# Patient Record
Sex: MALE
[De-identification: names, ages, dates, MRNs, and addresses within clinical notes are randomized per-mention and may not be internally consistent; named-entity substitution may affect disease eponyms.]

---

## 2018-09-11 ENCOUNTER — HOSPITAL (OUTPATIENT)
Dept: OTHER | Age: 53
End: 2018-09-11
Attending: EMERGENCY MEDICINE

## 2018-09-11 LAB
ALBUMIN SERPL-MCNC: 3.7 GM/DL (ref 3.6–5.1)
ALBUMIN/GLOB SERPL: 1 {RATIO} (ref 1–2.4)
ALP SERPL-CCNC: 102 UNIT/L (ref 45–117)
ALT SERPL-CCNC: 79 UNIT/L
AMPHETAMINES UR QL SCN>500 NG/ML: NEGATIVE
ANALYZER ANC (IANC): NORMAL
ANION GAP SERPL CALC-SCNC: 21 MMOL/L (ref 10–20)
APTT PPP: 23 SECONDS (ref 22–30)
APTT PPP: NORMAL S
AST SERPL-CCNC: 40 UNIT/L
BARBITURATES UR QL SCN>200 NG/ML: NEGATIVE
BASOPHILS # BLD: 0 THOUSAND/MCL (ref 0–0.3)
BASOPHILS NFR BLD: 1 %
BENZODIAZ UR QL SCN>200 NG/ML: NEGATIVE
BILIRUB SERPL-MCNC: 0.4 MG/DL (ref 0.2–1)
BUN SERPL-MCNC: 21 MG/DL (ref 6–20)
BUN/CREAT SERPL: 20 (ref 7–25)
BZE UR QL SCN>150 NG/ML: POSITIVE
CALCIUM SERPL-MCNC: 8.7 MG/DL (ref 8.4–10.2)
CANNABINOIDS UR QL SCN>50 NG/ML: NEGATIVE
CHLORIDE: 101 MMOL/L (ref 98–107)
CO2 SERPL-SCNC: 19 MMOL/L (ref 21–32)
CREAT SERPL-MCNC: 1.05 MG/DL (ref 0.67–1.17)
DIFFERENTIAL METHOD BLD: NORMAL
EOSINOPHIL # BLD: 0.3 THOUSAND/MCL (ref 0.1–0.5)
EOSINOPHIL NFR BLD: 4 %
ERYTHROCYTE [DISTWIDTH] IN BLOOD: 13.4 % (ref 11–15)
ETHANOL SERPL-MCNC: NORMAL MG/DL
GLOBULIN SER-MCNC: 3.7 GM/DL (ref 2–4)
GLUCOSE SERPL-MCNC: 127 MG/DL (ref 65–99)
HEMATOCRIT: 43.1 % (ref 39–51)
HGB BLD-MCNC: 14.8 GM/DL (ref 13–17)
INR PPP: 1
LYMPHOCYTES # BLD: 2.5 THOUSAND/MCL (ref 1–4)
LYMPHOCYTES NFR BLD: 29 %
MAGNESIUM SERPL-MCNC: 2.2 MG/DL (ref 1.7–2.4)
MCH RBC QN AUTO: 32 PG (ref 26–34)
MCHC RBC AUTO-ENTMCNC: 34.3 GM/DL (ref 32–36.5)
MCV RBC AUTO: 93.1 FL (ref 78–100)
MONOCYTES # BLD: 0.8 THOUSAND/MCL (ref 0.3–0.9)
MONOCYTES NFR BLD: 9 %
NEUTROPHILS # BLD: 5.1 THOUSAND/MCL (ref 1.8–7.7)
NEUTROPHILS NFR BLD: 57 %
NEUTS SEG NFR BLD: NORMAL %
NRBC (NRBCRE): NORMAL
NT-PROBNP SERPL-MCNC: 65 PG/ML
OPIATES UR QL SCN>300 NG/ML: NEGATIVE
PCP UR QL SCN>25 NG/ML: NEGATIVE
PLATELET # BLD: 371 THOUSAND/MCL (ref 140–450)
POTASSIUM SERPL-SCNC: 3.8 MMOL/L (ref 3.4–5.1)
PROT SERPL-MCNC: 7.4 GM/DL (ref 6.4–8.2)
PROTHROMBIN TIME: 9.8 SECONDS (ref 9.7–11.8)
PROTHROMBIN TIME: NORMAL
RBC # BLD: 4.63 MILLION/MCL (ref 4.5–5.9)
SODIUM SERPL-SCNC: 137 MMOL/L (ref 135–145)
TROPONIN I SERPL HS-MCNC: <0.02 NG/ML
WBC # BLD: 8.6 THOUSAND/MCL (ref 4.2–11)

## 2018-09-12 ENCOUNTER — DIAGNOSTIC TRANS (OUTPATIENT)
Dept: OTHER | Age: 53
End: 2018-09-12

## 2018-09-12 ENCOUNTER — HOSPITAL (OUTPATIENT)
Dept: OTHER | Age: 53
End: 2018-09-12
Attending: EMERGENCY MEDICINE

## 2018-09-12 LAB
AMPHETAMINES UR QL SCN>500 NG/ML: NEGATIVE
ANALYZER ANC (IANC): NORMAL
ANION GAP SERPL CALC-SCNC: 11 MMOL/L (ref 10–20)
BARBITURATES UR QL SCN>200 NG/ML: NEGATIVE
BASOPHILS # BLD: 0 THOUSAND/MCL (ref 0–0.3)
BASOPHILS NFR BLD: 0 %
BENZODIAZ UR QL SCN>200 NG/ML: NEGATIVE
BUN SERPL-MCNC: 13 MG/DL (ref 6–20)
BUN/CREAT SERPL: 16 (ref 7–25)
BZE UR QL SCN>150 NG/ML: POSITIVE
CALCIUM SERPL-MCNC: 8.6 MG/DL (ref 8.4–10.2)
CANNABINOIDS UR QL SCN>50 NG/ML: NEGATIVE
CHLORIDE: 105 MMOL/L (ref 98–107)
CO2 SERPL-SCNC: 27 MMOL/L (ref 21–32)
CREAT SERPL-MCNC: 0.8 MG/DL (ref 0.67–1.17)
DIFFERENTIAL METHOD BLD: NORMAL
EOSINOPHIL # BLD: 0.1 THOUSAND/MCL (ref 0.1–0.5)
EOSINOPHIL NFR BLD: 2 %
ERYTHROCYTE [DISTWIDTH] IN BLOOD: 13.5 % (ref 11–15)
ETHANOL SERPL-MCNC: NORMAL MG/DL
GLUCOSE SERPL-MCNC: 101 MG/DL (ref 65–99)
HEMATOCRIT: 43.1 % (ref 39–51)
HGB BLD-MCNC: 15 GM/DL (ref 13–17)
LYMPHOCYTES # BLD: 1 THOUSAND/MCL (ref 1–4)
LYMPHOCYTES NFR BLD: 13 %
MCH RBC QN AUTO: 32.2 PG (ref 26–34)
MCHC RBC AUTO-ENTMCNC: 34.8 GM/DL (ref 32–36.5)
MCV RBC AUTO: 92.5 FL (ref 78–100)
MONOCYTES # BLD: 0.7 THOUSAND/MCL (ref 0.3–0.9)
MONOCYTES NFR BLD: 10 %
NEUTROPHILS # BLD: 5.3 THOUSAND/MCL (ref 1.8–7.7)
NEUTROPHILS NFR BLD: 75 %
NEUTS SEG NFR BLD: NORMAL %
NRBC (NRBCRE): NORMAL
OPIATES UR QL SCN>300 NG/ML: NEGATIVE
PCP UR QL SCN>25 NG/ML: NEGATIVE
PLATELET # BLD: 318 THOUSAND/MCL (ref 140–450)
POTASSIUM SERPL-SCNC: 4 MMOL/L (ref 3.4–5.1)
RBC # BLD: 4.66 MILLION/MCL (ref 4.5–5.9)
SODIUM SERPL-SCNC: 139 MMOL/L (ref 135–145)
TROPONIN I SERPL HS-MCNC: <0.02 NG/ML
TROPONIN I SERPL HS-MCNC: <0.02 NG/ML
WBC # BLD: 7.1 THOUSAND/MCL (ref 4.2–11)

## 2018-09-14 ENCOUNTER — DIAGNOSTIC TRANS (OUTPATIENT)
Dept: OTHER | Age: 53
End: 2018-09-14

## 2019-05-28 ENCOUNTER — HOSPITAL (OUTPATIENT)
Dept: OTHER | Age: 54
End: 2019-05-28

## 2020-12-26 ENCOUNTER — HOSPITAL ENCOUNTER (OUTPATIENT)
Age: 55
Discharge: HOME | End: 2020-12-26
Payer: MEDICAID

## 2020-12-26 VITALS
DIASTOLIC BLOOD PRESSURE: 89 MMHG | RESPIRATION RATE: 18 BRPM | SYSTOLIC BLOOD PRESSURE: 162 MMHG | TEMPERATURE: 98 F | HEART RATE: 93 BPM | OXYGEN SATURATION: 100 %

## 2020-12-26 DIAGNOSIS — I10 HYPERTENSION, UNSPECIFIED TYPE: ICD-10-CM

## 2020-12-26 DIAGNOSIS — Z76.0 ENCOUNTER FOR MEDICATION REFILL: Primary | ICD-10-CM

## 2020-12-26 DIAGNOSIS — Z71.6 ENCOUNTER FOR SMOKING CESSATION COUNSELING: ICD-10-CM

## 2020-12-26 DIAGNOSIS — A63.0 WARTS, GENITAL: ICD-10-CM

## 2020-12-26 PROCEDURE — 99203 OFFICE O/P NEW LOW 30 MIN: CPT | Performed by: NURSE PRACTITIONER

## 2020-12-26 RX ORDER — LISINOPRIL 40 MG/1
40 TABLET ORAL DAILY
Qty: 30 TABLET | Refills: 0 | Status: SHIPPED | OUTPATIENT
Start: 2020-12-26 | End: 2021-01-25

## 2020-12-26 RX ORDER — LISINOPRIL 10 MG/1
40 TABLET ORAL DAILY
COMMUNITY

## 2020-12-26 RX ORDER — QUETIAPINE 100 MG/1
25 TABLET, FILM COATED ORAL NIGHTLY
COMMUNITY
Start: 2020-12-17

## 2020-12-26 RX ORDER — GABAPENTIN 300 MG/1
900 CAPSULE ORAL 2 TIMES DAILY
Status: ON HOLD | COMMUNITY
Start: 2020-12-17 | End: 2021-05-15

## 2020-12-26 RX ORDER — TRAZODONE HYDROCHLORIDE 150 MG/1
150 TABLET ORAL NIGHTLY PRN
Status: ON HOLD | COMMUNITY
Start: 2020-12-17 | End: 2021-05-15

## 2020-12-26 NOTE — ED PROVIDER NOTES
Patient Seen in: [facility]      History   Patient presents with:  Headache    Stated Complaint: HEADACHES, DIZZINESS X 7 DAYS    HPI    63yr old male, just discharged from [address] [facility] group home here today for a medication refill.   Pt reports wh oriented to person, place and time. Not in acute distress. HEENT: Head is normocephalic atraumatic. No oral vesicles or oral pallor. Mucous membranes moist.      Neck: No cervical lymphadenopathy. No stridor. Supple. No meningsmus.       Heart: S1-S2. daily.  Qty: 30 tablet Refills: 0    !! - Potential duplicate medications found. Please discuss with provider.

## 2020-12-26 NOTE — ED INITIAL ASSESSMENT (HPI)
Pt c/o headaches and dizziness x 3-4 days, states he ran out of blood pressure medication 1 week ago. He also states he has a wart on penile are.

## 2021-01-04 ENCOUNTER — HOSPITAL ENCOUNTER (OUTPATIENT)
Age: 56
Discharge: HOME | End: 2021-01-04
Payer: MEDICAID

## 2021-03-14 ENCOUNTER — HOSPITAL ENCOUNTER (EMERGENCY)
Facility: HOSPITAL | Age: 56
Discharge: HOME | End: 2021-03-14
Attending: EMERGENCY MEDICINE
Payer: MEDICAID

## 2021-03-14 ENCOUNTER — APPOINTMENT (OUTPATIENT)
Dept: CT IMAGING | Facility: HOSPITAL | Age: 56
End: 2021-03-14
Attending: EMERGENCY MEDICINE
Payer: MEDICAID

## 2021-03-14 VITALS
BODY MASS INDEX: 26.48 KG/M2 | HEART RATE: 80 BPM | HEIGHT: 70 IN | WEIGHT: 185 LBS | RESPIRATION RATE: 20 BRPM | DIASTOLIC BLOOD PRESSURE: 80 MMHG | TEMPERATURE: 98 F | OXYGEN SATURATION: 98 % | SYSTOLIC BLOOD PRESSURE: 123 MMHG

## 2021-03-14 DIAGNOSIS — R91.1 PULMONARY NODULE: ICD-10-CM

## 2021-03-14 DIAGNOSIS — S29.019A THORACIC MYOFASCIAL STRAIN, INITIAL ENCOUNTER: Primary | ICD-10-CM

## 2021-03-14 DIAGNOSIS — M62.838 SPASM OF MUSCLE: ICD-10-CM

## 2021-03-14 LAB
ANION GAP SERPL CALC-SCNC: 3 MMOL/L (ref 0–18)
BASOPHILS # BLD AUTO: 0.05 X10(3) UL (ref 0–0.2)
BASOPHILS NFR BLD AUTO: 0.5 %
BILIRUB UR QL: NEGATIVE
BUN BLD-MCNC: 12 MG/DL (ref 7–18)
BUN/CREAT SERPL: 14.5 (ref 10–20)
CALCIUM BLD-MCNC: 8.8 MG/DL (ref 8.5–10.1)
CHLORIDE SERPL-SCNC: 105 MMOL/L (ref 98–112)
CLARITY UR: CLEAR
CO2 SERPL-SCNC: 31 MMOL/L (ref 21–32)
COLOR UR: YELLOW
CREAT BLD-MCNC: 0.83 MG/DL
DEPRECATED RDW RBC AUTO: 41.3 FL (ref 35.1–46.3)
EOSINOPHIL # BLD AUTO: 0.19 X10(3) UL (ref 0–0.7)
EOSINOPHIL NFR BLD AUTO: 2 %
ERYTHROCYTE [DISTWIDTH] IN BLOOD BY AUTOMATED COUNT: 12 % (ref 11–15)
GLUCOSE BLD-MCNC: 82 MG/DL (ref 70–99)
GLUCOSE UR-MCNC: 50 MG/DL
HCT VFR BLD AUTO: 36.6 %
HGB BLD-MCNC: 12.5 G/DL
IMM GRANULOCYTES # BLD AUTO: 0.05 X10(3) UL (ref 0–1)
IMM GRANULOCYTES NFR BLD: 0.5 %
KETONES UR-MCNC: NEGATIVE MG/DL
LEUKOCYTE ESTERASE UR QL STRIP.AUTO: NEGATIVE
LYMPHOCYTES # BLD AUTO: 1.21 X10(3) UL (ref 1–4)
LYMPHOCYTES NFR BLD AUTO: 12.6 %
MCH RBC QN AUTO: 32 PG (ref 26–34)
MCHC RBC AUTO-ENTMCNC: 34.2 G/DL (ref 31–37)
MCV RBC AUTO: 93.6 FL
MONOCYTES # BLD AUTO: 0.55 X10(3) UL (ref 0.1–1)
MONOCYTES NFR BLD AUTO: 5.7 %
NEUTROPHILS # BLD AUTO: 7.53 X10 (3) UL (ref 1.5–7.7)
NEUTROPHILS # BLD AUTO: 7.53 X10(3) UL (ref 1.5–7.7)
NEUTROPHILS NFR BLD AUTO: 78.7 %
NITRITE UR QL STRIP.AUTO: NEGATIVE
OSMOLALITY SERPL CALC.SUM OF ELEC: 287 MOSM/KG (ref 275–295)
PH UR: 7 [PH] (ref 5–8)
PLATELET # BLD AUTO: 293 10(3)UL (ref 150–450)
POTASSIUM SERPL-SCNC: 4.6 MMOL/L (ref 3.5–5.1)
PROT UR-MCNC: NEGATIVE MG/DL
RBC # BLD AUTO: 3.91 X10(6)UL
RBC #/AREA URNS AUTO: >10 /HPF
SODIUM SERPL-SCNC: 139 MMOL/L (ref 136–145)
SP GR UR STRIP: 1.01 (ref 1–1.03)
UROBILINOGEN UR STRIP-ACNC: 2
WBC # BLD AUTO: 9.6 X10(3) UL (ref 4–11)

## 2021-03-14 PROCEDURE — 99285 EMERGENCY DEPT VISIT HI MDM: CPT

## 2021-03-14 PROCEDURE — 81001 URINALYSIS AUTO W/SCOPE: CPT | Performed by: EMERGENCY MEDICINE

## 2021-03-14 PROCEDURE — 93005 ELECTROCARDIOGRAM TRACING: CPT

## 2021-03-14 PROCEDURE — 96375 TX/PRO/DX INJ NEW DRUG ADDON: CPT

## 2021-03-14 PROCEDURE — 96374 THER/PROPH/DIAG INJ IV PUSH: CPT

## 2021-03-14 PROCEDURE — 80048 BASIC METABOLIC PNL TOTAL CA: CPT

## 2021-03-14 PROCEDURE — 81001 URINALYSIS AUTO W/SCOPE: CPT

## 2021-03-14 PROCEDURE — 80048 BASIC METABOLIC PNL TOTAL CA: CPT | Performed by: EMERGENCY MEDICINE

## 2021-03-14 PROCEDURE — 74177 CT ABD & PELVIS W/CONTRAST: CPT | Performed by: EMERGENCY MEDICINE

## 2021-03-14 PROCEDURE — 85025 COMPLETE CBC W/AUTO DIFF WBC: CPT

## 2021-03-14 PROCEDURE — 85025 COMPLETE CBC W/AUTO DIFF WBC: CPT | Performed by: EMERGENCY MEDICINE

## 2021-03-14 PROCEDURE — 93010 ELECTROCARDIOGRAM REPORT: CPT | Performed by: EMERGENCY MEDICINE

## 2021-03-14 RX ORDER — LIDOCAINE 50 MG/G
1 PATCH TOPICAL EVERY 24 HOURS
Qty: 6 PATCH | Refills: 0 | Status: SHIPPED | OUTPATIENT
Start: 2021-03-14 | End: 2021-03-20

## 2021-03-14 RX ORDER — HYDROCODONE BITARTRATE AND ACETAMINOPHEN 5; 325 MG/1; MG/1
1 TABLET ORAL EVERY 6 HOURS PRN
Qty: 10 TABLET | Refills: 0 | Status: ON HOLD | OUTPATIENT
Start: 2021-03-14 | End: 2021-05-16

## 2021-03-14 RX ORDER — DIAZEPAM 5 MG/ML
5 INJECTION, SOLUTION INTRAMUSCULAR; INTRAVENOUS ONCE
Status: COMPLETED | OUTPATIENT
Start: 2021-03-14 | End: 2021-03-14

## 2021-03-14 RX ORDER — MORPHINE SULFATE 4 MG/ML
4 INJECTION, SOLUTION INTRAMUSCULAR; INTRAVENOUS ONCE
Status: COMPLETED | OUTPATIENT
Start: 2021-03-14 | End: 2021-03-14

## 2021-03-14 RX ORDER — CYCLOBENZAPRINE HCL 10 MG
10 TABLET ORAL 3 TIMES DAILY PRN
Qty: 10 TABLET | Refills: 0 | Status: SHIPPED | OUTPATIENT
Start: 2021-03-14 | End: 2021-03-17

## 2021-03-14 RX ORDER — KETOROLAC TROMETHAMINE 15 MG/ML
15 INJECTION, SOLUTION INTRAMUSCULAR; INTRAVENOUS ONCE
Status: COMPLETED | OUTPATIENT
Start: 2021-03-14 | End: 2021-03-14

## 2021-03-14 NOTE — ED PROVIDER NOTES
Patient Seen in: [facility] AND [facility] Emergency Department      History   Patient presents with:  Pain    Stated Complaint: left side pain    HPI/Subjective:   HPI    62 y/o male w/ outpt reported noneventful TURP at [facility] on Thursday feeling good Friday but well-developed. No distress. Head: Normocephalic and atraumatic. Eyes: Conjunctivae are normal. Pupils are equal, round, and reactive to light. Cardiovascular: Normal rate, regular rhythm and intact distal pulses.     Pulmonary/Chest: Effort normal. N Abnormal            Final result                 Please view results for these tests on the individual orders.    RAINBOW DRAW BLUE   RAINBOW DRAW LAVENDER   RAINBOW DRAW LIGHT GREEN   RAINBOW DRAW GOLD     EKG    Rate, intervals and axes as noted on EKG Re throughout the colon. Small hiatal hernia with wall thickening at the gastroesophageal junction. MESENTERY: Tiny umbilical hernia containing mesenteric fat. No bowel herniation.  PELVIS: Asymmetric wall thickening is seen involving the left lateral bladder to return here with any worsening or change. He and wife are comfortable with this discharge plan at this time.                          Disposition and Plan     Clinical Impression:  Thoracic myofascial strain, initial encounter  (primary encounter diagno

## 2021-03-14 NOTE — ED INITIAL ASSESSMENT (HPI)
Atraumatic left sided mid back since yesterday with pain with deep breathing. Denies abdominal pain    Had TURP procedure done on Friday at an outside facility.

## 2021-03-14 NOTE — ED NOTES
Patient safe to DC home per MD. Stafford Peer to dress self. DC teaching done, instructions reviewed with patient including when and how to follow up with healthcare providers and when to seek emergency care. The patient verbalizes understanding.  Peripheral IV disc

## 2021-05-15 ENCOUNTER — APPOINTMENT (OUTPATIENT)
Dept: GENERAL RADIOLOGY | Facility: HOSPITAL | Age: 56
DRG: 603 | End: 2021-05-15
Attending: EMERGENCY MEDICINE
Payer: MEDICAID

## 2021-05-15 ENCOUNTER — HOSPITAL ENCOUNTER (OUTPATIENT)
Facility: HOSPITAL | Age: 56
Setting detail: OBSERVATION
Discharge: HOME | DRG: 603 | End: 2021-05-16
Attending: EMERGENCY MEDICINE | Admitting: HOSPITALIST
Payer: MEDICAID

## 2021-05-15 DIAGNOSIS — L03.119 CELLULITIS OF HAND: ICD-10-CM
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
## 2021-05-15 DIAGNOSIS — S61.451A CAT BITE OF HAND, RIGHT, INITIAL ENCOUNTER: Primary | ICD-10-CM

## 2021-05-15 PROCEDURE — 73130 X-RAY EXAM OF HAND: CPT | Performed by: EMERGENCY MEDICINE

## 2021-05-15 PROCEDURE — 99285 EMERGENCY DEPT VISIT HI MDM: CPT

## 2021-05-15 PROCEDURE — 96365 THER/PROPH/DIAG IV INF INIT: CPT

## 2021-05-15 PROCEDURE — 96376 TX/PRO/DX INJ SAME DRUG ADON: CPT

## 2021-05-15 PROCEDURE — 85025 COMPLETE CBC W/AUTO DIFF WBC: CPT | Performed by: EMERGENCY MEDICINE

## 2021-05-15 PROCEDURE — 80048 BASIC METABOLIC PNL TOTAL CA: CPT | Performed by: EMERGENCY MEDICINE

## 2021-05-15 PROCEDURE — 96375 TX/PRO/DX INJ NEW DRUG ADDON: CPT

## 2021-05-15 RX ORDER — QUETIAPINE 25 MG/1
25 TABLET, FILM COATED ORAL NIGHTLY
Status: DISCONTINUED | OUTPATIENT
Start: 2021-05-15 | End: 2021-05-16

## 2021-05-15 RX ORDER — MORPHINE SULFATE 2 MG/ML
2 INJECTION, SOLUTION INTRAMUSCULAR; INTRAVENOUS EVERY 4 HOURS PRN
Status: DISCONTINUED | OUTPATIENT
Start: 2021-05-15 | End: 2021-05-16

## 2021-05-15 RX ORDER — HEPARIN SODIUM 5000 [USP'U]/ML
5000 INJECTION, SOLUTION INTRAVENOUS; SUBCUTANEOUS EVERY 12 HOURS SCHEDULED
Status: DISCONTINUED | OUTPATIENT
Start: 2021-05-15 | End: 2021-05-16

## 2021-05-15 RX ORDER — METOPROLOL SUCCINATE 25 MG/1
25 TABLET, EXTENDED RELEASE ORAL DAILY
Status: DISCONTINUED | OUTPATIENT
Start: 2021-05-15 | End: 2021-05-16

## 2021-05-15 RX ORDER — KETOROLAC TROMETHAMINE 15 MG/ML
15 INJECTION, SOLUTION INTRAMUSCULAR; INTRAVENOUS EVERY 6 HOURS PRN
Status: DISCONTINUED | OUTPATIENT
Start: 2021-05-15 | End: 2021-05-16

## 2021-05-15 RX ORDER — MORPHINE SULFATE 4 MG/ML
6 INJECTION, SOLUTION INTRAMUSCULAR; INTRAVENOUS ONCE
Status: COMPLETED | OUTPATIENT
Start: 2021-05-15 | End: 2021-05-15

## 2021-05-15 RX ORDER — SODIUM CHLORIDE 9 MG/ML
INJECTION, SOLUTION INTRAVENOUS CONTINUOUS
Status: DISCONTINUED | OUTPATIENT
Start: 2021-05-15 | End: 2021-05-16

## 2021-05-15 RX ORDER — LISINOPRIL 40 MG/1
40 TABLET ORAL DAILY
Status: DISCONTINUED | OUTPATIENT
Start: 2021-05-15 | End: 2021-05-16

## 2021-05-15 RX ORDER — MORPHINE SULFATE 4 MG/ML
4 INJECTION, SOLUTION INTRAMUSCULAR; INTRAVENOUS ONCE
Status: COMPLETED | OUTPATIENT
Start: 2021-05-15 | End: 2021-05-15

## 2021-05-15 RX ORDER — METOPROLOL SUCCINATE 25 MG/1
25 TABLET, EXTENDED RELEASE ORAL DAILY
COMMUNITY

## 2021-05-15 RX ORDER — HYDROCODONE BITARTRATE AND ACETAMINOPHEN 5; 325 MG/1; MG/1
1 TABLET ORAL EVERY 6 HOURS PRN
Status: DISCONTINUED | OUTPATIENT
Start: 2021-05-15 | End: 2021-05-16

## 2021-05-15 RX ORDER — KETOROLAC TROMETHAMINE 15 MG/ML
15 INJECTION, SOLUTION INTRAMUSCULAR; INTRAVENOUS ONCE
Status: COMPLETED | OUTPATIENT
Start: 2021-05-15 | End: 2021-05-15

## 2021-05-15 NOTE — H&P
[facility]    History & Physical    Liya Albasilio Patient Status:  Inpatient    1965 MRN [id number]   Location [facility] 4W/SW/SE Attending Yeimi Blackmon MD   Hosp Day # 0 PCP PHYSICIAN NONSTAFF     Date:  5/15/2021  Date of Exam:   Vital Signs:  Blood pressure 133/89, pulse 75, temperature 98.5 °F (36.9 °C), temperature source Oral, resp. rate 17, height 5' 9\" (1.753 m), weight 174 lb (78.9 kg), SpO2 100 %.      General NAD  HEENT PERRLA  CVS Normal S1 S2, no murmurs rubs or

## 2021-05-15 NOTE — ED QUICK NOTES
Orders for admission, patient is aware of plan and ready to go upstairs. Any questions, please call ED RN Chandrika King  at extension [phone].    Type of COVID test sent:Rapid  COVID Suspicion level: Low    Titratable drug(s) infusing:   Rate:    LOC at time of transp

## 2021-05-15 NOTE — CM/SW NOTE
Spoke with patient regarding out of [employer]    - unsure of costs of admission  -UR will send clinicals to insurance   -OON letter    Patient agreeing to stay

## 2021-05-15 NOTE — ED INITIAL ASSESSMENT (HPI)
Pt presents to Ed with c/o cat bite  To right hand , there is obvious skin break and swelling  Onset yesterday at 4pm

## 2021-05-15 NOTE — PLAN OF CARE
Received patient from ED. Alert and orientated. BP elevated arriving to the floor. Skin remains intact. Right hand swollen and red, x2 puncture sites from cat bite. Tolerating general diet. voiding freely. Calls appropriately. frequent rounding.  All needs m and evaluate response  - Consider cultural and social influences on pain and pain management  - Manage/alleviate anxiety  - Utilize distraction and/or relaxation techniques  - Monitor for opioid side effects  - Notify MD/LIP if interventions unsuccessful o interpreters to assist at discharge as needed  - Consider post-discharge preferences of patient/family/discharge partner  - Complete POLST form as appropriate  - Assess patient's ability to be responsible for managing their own health  - Refer to [facility]

## 2021-05-15 NOTE — ED PROVIDER NOTES
Patient Seen in: [facility] AND [facility] Emergency Department      History   Patient presents with:  Bite    Stated Complaint: Cat bites- on chemo    HPI/Subjective:   HPI    62 y/o male bit on the R and L hand yesterday by his small cat at home icing the hand distress. Musculoskeletal: Scattered abrasions with obvious nasal laceration redness or warmth or significant swelling to the left hand.   There is moderate swelling of the dorsal right hand as well as scattered areas of cellulitis redness and several sma and by the 5th MCP joint with redness to dorsal aspect of hand and general swelling. TECHNIQUE: 3 views were obtained. FINDINGS:  BONES: There is no fracture or dislocation.   There is no cortical osteolysis or periosteal reaction to suggest osteomyeliti close monitoring and repeat IV Unasyn dosing. I spoke with Dr. Carrillo Mi from hand surgeon she will be happy to see the patient. I spoke to Dr. Kaiser Torres on for unassigned internal medicine patient's today. The patient is stable and agreeable.   Admission dis

## 2021-05-16 VITALS
HEIGHT: 69 IN | SYSTOLIC BLOOD PRESSURE: 126 MMHG | DIASTOLIC BLOOD PRESSURE: 79 MMHG | OXYGEN SATURATION: 100 % | WEIGHT: 174 LBS | BODY MASS INDEX: 25.77 KG/M2 | TEMPERATURE: 98 F | HEART RATE: 66 BPM | RESPIRATION RATE: 18 BRPM

## 2021-05-16 PROCEDURE — 85025 COMPLETE CBC W/AUTO DIFF WBC: CPT | Performed by: HOSPITALIST

## 2021-05-16 PROCEDURE — 80053 COMPREHEN METABOLIC PANEL: CPT | Performed by: HOSPITALIST

## 2021-05-16 RX ORDER — HYDROCODONE BITARTRATE AND ACETAMINOPHEN 5; 325 MG/1; MG/1
1 TABLET ORAL EVERY 8 HOURS PRN
Qty: 10 TABLET | Refills: 0 | Status: SHIPPED | OUTPATIENT
Start: 2021-05-16

## 2021-05-16 RX ORDER — AMOXICILLIN AND CLAVULANATE POTASSIUM 875; 125 MG/1; MG/1
1 TABLET, FILM COATED ORAL 2 TIMES DAILY
Qty: 28 TABLET | Refills: 0 | Status: SHIPPED | OUTPATIENT
Start: 2021-05-16 | End: 2021-05-30

## 2021-05-16 NOTE — CONSULTS
[facility] ID CONSULT NOTE    Ciarra Bobo Patient Status:  Inpatient    1965 MRN [id number]   Location [facility] 4W/SW/SE Attending Nasra Simpson MD   Hosp Day # 1 PCP PHYSICIAN NONSTAFF       Reason for Cons injection 5,000 Units, 5,000 Units, Subcutaneous, 2 times per day  •  0.9% NaCl infusion, , Intravenous, Continuous  •  QUEtiapine Fumarate (SEROQUEL) tab 25 mg, 25 mg, Oral, Nightly    Review of Systems:  CONSTITUTIONAL:  No weight loss, weakness or fatig 8.3*   ALB  --  2.6*    143   K 3.9 4.3    111   CO2 29.0 30.0   ALKPHO  --  65   AST  --  12*   ALT  --  14*   BILT  --  0.5   TP  --  5.9*       Microbiology: Reviewed in EMR    Radiology: Reviewed    ASSESSMENT:    Antibiotics: Unasyn (5/15-

## 2021-05-16 NOTE — DISCHARGE SUMMARY
[facility] AND [facility]  Discharge Summary    Mac Cline Patient Status:  Inpatient    1965 MRN [id number]   Location [facility] 4W/SW/SE Attending Pily Lira MD   Hosp Day # 1 PCP PHYSICIAN NONSTAFF         Admit date: 5/15/2021    [address]:    Normocephalic, without obvious abnormality, atraumatic   Eyes:    PERRL, conjunctiva/corneas clear, EOM's intact, fundi     benign, both eyes        Ears:    Normal TM's and external ear canals, both ears   Nose:   Nares normal, septum midline, known as: [id number]  What changed: when to take this      Take 1 tablet by mouth every 8 (eight) hours as needed for Pain.    Quantity: 10 tablet  Refills: 0        CONTINUE taking these medications      Instructions Prescription details   lisinopril 10 MG Tabs

## 2021-05-16 NOTE — PLAN OF CARE
No acute changes. Alert and orientated. Vital signs stable. . Skin remains intact. Right hand swollen and red, but much improved since yesterday. IVF infusing. Unasyn for antibiotic coverage. Plan for Augmentin PO X 2 weeks on discharge.  If hand gets worse, fall precautions as indicated by assessment.  - Educate pt/family on patient safety including physical limitations  - Instruct pt to call for assistance with activity based on assessment  - Modify environment to reduce risk of injury  - Provide assistive d Patient's Short Term Goal: no pain    Interventions:   Pain medication   Antibiotics   - See additional Care Plan goals for specific interventions  Outcome: Progressing

## 2021-05-16 NOTE — CONSULTS
[facility]    Report of Consultation    Avinash Jordan Patient Status:  Inpatient    1965 MRN [id number]   [facility] 4W/SW/SE Attending Temi Martinez MD   Hosp Day # 1 PCP PHYSICIAN NONSTAFF     Date of Admission:  5 Alcohol use: Never    Drug use: Never    Allergies/Medications: Allergies: No Known Allergies  Metoprolol Succinate ER 25 MG Oral Tablet 24 Hr, Take 25 mg by mouth daily. QUEtiapine Fumarate 100 MG Oral Tab, Take 25 mg by mouth nightly.     lisinopril 10 bodies.  2. Arthritic changes throughout the right hand, most of which appears to represent osteoarthritis although the appearance involving the 2nd and 3rd metacarpophalangeal joints could indicate secondary osteoarthritis in the setting of an underlying i

## 2021-05-16 NOTE — PLAN OF CARE
Problem: Patient Centered Care  Goal: Patient preferences are identified and integrated in the patient's plan of care  Description: Interventions:  - What would you like us to know as we care for you?   - Provide timely, complete, and accurate informatio factors as ordered  - Implement neutropenic guidelines  Outcome: Progressing     Problem: SAFETY ADULT - FALL  Goal: Free from fall injury  Description: INTERVENTIONS:  - Assess pt frequently for physical needs  - Identify cognitive and physical deficits a

## 2021-05-19 NOTE — PAYOR COMM NOTE
--------------  ADMISSION REVIEW     Payor: [address] [place] [address] #:  [id number]  Authorization Number: [id number]    Admit date: 5/15/21  Admit time:  5:29 PM     Admitting Physician: Rashawn Saeed MD  Attending Physician:  No att. providers found normal. Pupils are equal, round, and reactive to light. Cardiovascular: Normal rate, regular rhythm and intact distal pulses. Pulmonary/Chest: Effort normal. No respiratory distress.    Musculoskeletal: Scattered abrasions with obvious nasal laceration osteoarthritis in the setting of an underlying inflammatory arthropathy; correlate clinically. MDM      Patient agreeable to admission to hospital for close monitoring and repeat IV Unasyn dosing.   I spoke with Dr. Donnie Pendleton from hand surgeon she w ED, patient was afebrile with HR 80s, RR 20, /90, saturating 100% on RA. Xr hand showed soft tissue swelling. Patient is admitted for further evaluation and ID was consulted.      Allergies   Allergies: No Known Allergies    Review of Systems:   Nael (L) 05/16/2021     AST 12 (L) 05/16/2021     ALT 14 (L) 05/16/2021     Impression:     Cat bite of hand, right, initial encounter      Cellulitis of hand      Recommendations:  Diagnosis and treatment options discussed in great detail with patient.   XRays infection is this being used to treat? Cellulitis  What is the anticipated duration of therapy?  5-7 days    2256-New Bag              0649-New Bag   1306-New Bag [C]    1600-D/C'd        Ampicillin-Sulbactam Sodium (UNASYN) 3 g in sodium chloride 0.9% 100                    HYDROcodone-acetaminophen (NORCO) 5-325 MG per tab 1 tablet   Dose: 1 tablet  Freq: Every 6 hours PRN Route: OR  PRN Reason: moderate pain  Start: 05/15/21 1831 End: 05/16/21 1600 2004-Given              0832-Given   1600-D/C'd

## 2021-05-19 NOTE — PAYOR COMM NOTE
--------------  DISCHARGE REVIEW    Payor: [address] [place] [address] #:  [id number]  Authorization Number: [id number]    Admit date: 5/15/21  Admit time:   5:29 PM  Discharge Date: 5/16/2021  2:00 PM     Admitting Physician: Ish Medrano MD  Attending reviewed. Continue pain management. ID consult for abx management. Augmentin for 14 days.     HTN- continue lisinopril and metoprolol      dvt ppx - heparin     Disposition: home likely tomorrow once cleared by ID.     Consults: ID    Significant Diagnosti CNII-XII intact.  Normal strength, sensation and reflexes       throughout     Disposition: Home or Self Care    Patient Instructions:      Discharge Medications      START taking these medications      Instructions Prescription details   Amoxicillin-Pot Cl

## 2021-06-07 ENCOUNTER — HOSPITAL ENCOUNTER (EMERGENCY)
Facility: HOSPITAL | Age: 56
Discharge: HOME | End: 2021-06-07
Attending: EMERGENCY MEDICINE
Payer: MEDICAID

## 2021-06-07 ENCOUNTER — APPOINTMENT (OUTPATIENT)
Dept: GENERAL RADIOLOGY | Facility: HOSPITAL | Age: 56
End: 2021-06-07
Attending: EMERGENCY MEDICINE
Payer: MEDICAID

## 2021-06-07 VITALS
SYSTOLIC BLOOD PRESSURE: 152 MMHG | RESPIRATION RATE: 18 BRPM | DIASTOLIC BLOOD PRESSURE: 95 MMHG | TEMPERATURE: 99 F | HEART RATE: 79 BPM | OXYGEN SATURATION: 98 %

## 2021-06-07 DIAGNOSIS — S61.459A CAT BITE OF HAND, UNSPECIFIED LATERALITY, INITIAL ENCOUNTER: ICD-10-CM

## 2021-06-07 DIAGNOSIS — L03.119 CELLULITIS OF HAND: Primary | ICD-10-CM
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
## 2021-06-07 PROCEDURE — 99284 EMERGENCY DEPT VISIT MOD MDM: CPT

## 2021-06-07 PROCEDURE — 87040 BLOOD CULTURE FOR BACTERIA: CPT | Performed by: EMERGENCY MEDICINE

## 2021-06-07 PROCEDURE — 85025 COMPLETE CBC W/AUTO DIFF WBC: CPT | Performed by: EMERGENCY MEDICINE

## 2021-06-07 PROCEDURE — 96365 THER/PROPH/DIAG IV INF INIT: CPT

## 2021-06-07 PROCEDURE — 36415 COLL VENOUS BLD VENIPUNCTURE: CPT

## 2021-06-07 PROCEDURE — 96375 TX/PRO/DX INJ NEW DRUG ADDON: CPT

## 2021-06-07 PROCEDURE — 73130 X-RAY EXAM OF HAND: CPT | Performed by: EMERGENCY MEDICINE

## 2021-06-07 PROCEDURE — 83605 ASSAY OF LACTIC ACID: CPT | Performed by: EMERGENCY MEDICINE

## 2021-06-07 PROCEDURE — 80048 BASIC METABOLIC PNL TOTAL CA: CPT | Performed by: EMERGENCY MEDICINE

## 2021-06-07 RX ORDER — MORPHINE SULFATE 4 MG/ML
4 INJECTION, SOLUTION INTRAMUSCULAR; INTRAVENOUS ONCE
Status: COMPLETED | OUTPATIENT
Start: 2021-06-07 | End: 2021-06-07

## 2021-06-07 NOTE — ED QUICK NOTES
rec'd care of pt from triage. Has cat bite to bilateral hands. Was bit to right hand 2 weeks ago and took partial course of augmentin. Was bit again to left and right hands on Friday and began taking augmentin again.  Swelling noted to bilateral hands, righ

## 2021-06-07 NOTE — ED INITIAL ASSESSMENT (HPI)
C/O continued swelling and pain to R hand 2nd to a cat bite. + swelling noted, pt reports decreased sensation and dexterity. New cat bite noted to L index finger. + swelling noted.

## 2021-06-07 NOTE — ED PROVIDER NOTES
Patient Seen in: [facility] AND [facility] Emergency Department      History   Patient presents with:  Cellulitis    Stated Complaint: old cat bites and scratches to bilateral hands, swelling and painful, 14 days a*    HPI/Subjective:   HPI    13-year-old male w Current:BP (!) 152/95   Pulse 79   Temp 99.1 °F (37.3 °C) (Oral)   Resp 18   SpO2 98%         Physical Exam      General Appearance: alert, no distress  Eyes: pupils equal and round no pallor or injection  ENT, Mouth: mucous membranes moist  Respirat ACID, PLASMA   RAINBOW DRAW LAVENDER   RAINBOW DRAW LIGHT GREEN   RAINBOW DRAW GOLD   BLOOD CULTURE   BLOOD CULTURE                 MDM      Lab and x-ray results noted. Discussed with Dr. Sarah Ambrosio, hand surgery on-call.   He requested the patient be given

## 2021-06-07 NOTE — ED QUICK NOTES
Blood cultures x2 sets drawn by er tech. Pain slightly improved since morphine administration. Pt requested breakfast tray; ordered.

## 2021-06-07 NOTE — ED QUICK NOTES
Verbalized understanding of dc instructions and appropriate follow up care. Verbalized that he will go straight to hand surgeon office after leaving er. Given copy of dc instructions.